# Patient Record
Sex: FEMALE | Race: WHITE
[De-identification: names, ages, dates, MRNs, and addresses within clinical notes are randomized per-mention and may not be internally consistent; named-entity substitution may affect disease eponyms.]

---

## 2017-02-08 ENCOUNTER — HOSPITAL ENCOUNTER (OUTPATIENT)
Dept: HOSPITAL 45 - C.RDSM | Age: 67
Discharge: HOME | End: 2017-02-08
Attending: ORTHOPAEDIC SURGERY
Payer: COMMERCIAL

## 2017-02-08 DIAGNOSIS — M65.30: Primary | ICD-10-CM

## 2017-02-08 NOTE — DIAGNOSTIC IMAGING REPORT
LEFT HAND MIN 3 VIEWS



CLINICAL HISTORY: LEFT HAND PAIN/TRIGGER FINGER pain



COMPARISON: None.



DISCUSSION: Moderate generalized osteopenia of the major osseous structures.

Tavarez a potential partial periarticular distribution. Possible early

rheumatoid variant is considered.



No well-defined acute bony abnormality. Alignment is anatomic. There is no

evidence for soft tissue swelling.



IMPRESSION: Probable developing rheumatoid change. No acute bony abnormality.







Electronically signed by:  Ang Ibarra M.D.

2/8/2017 12:44 PM



Dictated Date/Time:  2/8/2017 12:43 PM

## 2017-02-27 ENCOUNTER — HOSPITAL ENCOUNTER (OUTPATIENT)
Dept: HOSPITAL 45 - X.SURG | Age: 67
Discharge: HOME | End: 2017-02-27
Attending: ORTHOPAEDIC SURGERY
Payer: COMMERCIAL

## 2017-02-27 VITALS — OXYGEN SATURATION: 95 % | HEART RATE: 57 BPM | SYSTOLIC BLOOD PRESSURE: 139 MMHG | DIASTOLIC BLOOD PRESSURE: 80 MMHG

## 2017-02-27 VITALS — TEMPERATURE: 97.34 F

## 2017-02-27 VITALS
HEIGHT: 64.02 IN | BODY MASS INDEX: 36.44 KG/M2 | BODY MASS INDEX: 36.44 KG/M2 | WEIGHT: 213.45 LBS | WEIGHT: 213.45 LBS | HEIGHT: 64.02 IN

## 2017-02-27 DIAGNOSIS — M65.332: Primary | ICD-10-CM

## 2017-02-27 DIAGNOSIS — Z79.4: ICD-10-CM

## 2017-02-27 DIAGNOSIS — E11.9: ICD-10-CM

## 2017-02-27 DIAGNOSIS — Z79.899: ICD-10-CM

## 2017-02-27 DIAGNOSIS — I10: ICD-10-CM

## 2017-02-27 DIAGNOSIS — E78.5: ICD-10-CM

## 2017-02-27 DIAGNOSIS — E66.9: ICD-10-CM

## 2017-02-27 NOTE — [facility] POST OPERATIVE BRIEF NOTE
Immediate Operative Summary


Operative Date


Feb 27, 2017.





Pre-Operative Diagnosis





Left Middle Finger Trigger Digit





Post-Operative Diagnosis





Same





Procedure(s) Performed





Left Middle Finger Open Trigger Finger Release





Surgeon


Dr. Coughlin





Assistant Surgeon(s)


Dr. Whitley





Estimated Blood Loss


3 ML





Findings


Thickened synovium L MF Flexor tendon sheath.





Specimens





A. Left Middle Finger Synovium Tendon Sheath





Drains


n/a





Anesthesia


Local + sedation





Complication(s)


None





Disposition


Recovery Room / PACU (Stable)

## 2017-02-27 NOTE — DISCHARGE INSTRUCTIONS-S[facility]
Discharge Instructions


Visit


Reason for Visit:  Left Middle Trigger Finger





Discharge


Discharge Diagnosis / Problem:  S/P L MF Trigger finger release.





Discharge Goals


Goal(s):  Decrease discomfort, Improve function, Increase independence





Activity Recommendations


Activity Limitations:  per Instructions/Follow-up section


May Resume Sexual Activity:  when tolerated


Shower/Bathe:  may shower/bathe in 3 days





Anesthesia


.





Post Anesthesia Instructions:





If you have had General Anesthesia or IV Sedation:





*  Do not drive today.


*  Resume driving when surgeon permits.


*  Do not make important decisions or sign legal documents today.


*  Call surgeon for:





   1.  Temperature elevations greater than 101 degrees F.


   2.  Uncontrollable pain.


   3.  Excessive bleeding.


   4.  Persistent nausea and vomiting.


   5.  Medication intolerance (nausea, vomiting or rash).





*  For nausea and vomiting use only clear liquids such as: tea, soda, bouillon 

until nausea subsides, then gradually increase diet as tolerated.





*  If you have any concerns or questions, call your surgeon's office.  If 

physician is unavailable and it is an emergency, call 911 or go to the nearest 

emergency room.





.





Instructions / Follow-Up


Instructions / Follow-Up


Dr. Coughlin  in 10-15 days


PT 2-3 days





Diet Recommendations


Home Diet:  resume previous diet





Procedures


Procedures Performed:  


Left Middle Finger Open Trigger Finger Release





Pending Studies


Studies pending at discharge:  no





Medical Emergencies








.


Who to Call and When:





Medical Emergencies:  If at any time you feel your situation is an emergency, 

please call 911 immediately.





.





Non-Emergent Contact


Non-Emergency issues call your:  Surgeon


Call Non-Emergent contact if:  temperature is above 101.5, your pain is not 

controlled, wound has increased drainage, wound has increased redness





.


.





"Provider Documentation" section prepared by Alex Coughlin.

## 2017-02-27 NOTE — [facility] POST OPERATIVE BRIEF NOTE
Immediate Operative Summary


Operative Date


Feb 27, 2017.





Pre-Operative Diagnosis





Left Middle Finger Trigger Digit





Post-Operative Diagnosis





Same





Procedure(s) Performed





Left Middle Finger Open Trigger Finger Release





Surgeon


Dr. Coughlin





Assistant Surgeon(s)


Dr. Whitley





Estimated Blood Loss


3 ML





Fluids (cc crystalloids)


500





Specimens





A. Left Middle Finger Synovium Tendon Sheath





Complication(s)


None





Disposition


PCU

## 2017-02-27 NOTE — [facility] OPERATIVE REPORT
Operative Report


Operative Date


Feb 27, 2017.





Pre-Operative Diagnosis





Left Middle Finger Trigger Digit





Post-Operative Diagnosis





Same





Procedure(s) Performed





Left Middle Finger Open Trigger Finger Release





Surgeon


Dr. Coughlin





Assistant Surgeon(s)


Dr. Whitley





Estimated Blood Loss


3 ML





Findings


Thickened synovium L MF Flexor tendon sheath.





Fluids (cc crystalloids)


500





Specimens





A. Left Middle Finger Synovium Tendon Sheath





Drains


n/a





Anesthesia


Local + sedation





Complication(s)


None





Disposition


Recovery Room / PACU (Stable)





Implants


n/a





Indications


The patient is a 66 year old female with a painful left middle finger trigger 

her that has not responded to conservative treatment.  The patient understands 

the risks of surgery, which include but are not limited to: bleeding, infection

, re-operation, damage to nerves and arteries, and continued pain.  The patient 

understands all of these instructions and explanations, all of their questions 

have been satisfactorily addressed.  The patient has elected to proceed with 

surgery and the informed consent was signed.





Description of Procedure


The patient was taken to the Operating Room and placed in the supine position 

on the operating table.  After a multidisciplinary time-out was performed 

identifying my initials on the left middle finger as the correct and operative 

limb, the patient agreed.  Prior to the incision being made, 600 milligrams of 

intravenous clindamycin was given.  The left arm was prepped and draped in the 

usual Orthopaedic sterile fashion.  A digital nerve block was performed with 6 

cc of a 50:50 mix of 1% Lidocaine and 0.5% Bupivacaine plain in the standard 

fashion.  The above mixture was injected along the planned incision for another 

2 cc.   After the local anesthetic had taken affect, a small 1 cm incision was 

made in the rodriguez crease in line with the middle finger.  This was carried 

down to the A1 pulley.  The A1 pulley was incised and a small portion was 

excised as well as some synovium for specimen.  The right angle clamp was used 

to deliver the flexor tendons into the wound.  The tendon then glided without 

catching or locking.  The patient was asked to move flex her left middle finger 

and there was no catching or locking.     





The wound and tendon sheath were copiously irrigated.  The skin was closed with 

4-0 Nylon.  The incision was covered with Xeroform, 4x4's, sterile cast padding 

and an ACE. The sponge and needle counts were correct.


I attest to the content of the Intraoperative Record and any orders documented 

therein.  Any exceptions are noted below.

## 2017-02-27 NOTE — ANESTHESIA PROGRESS NT - [facility]
Anesthesia Post Op Note


Date & Time


Feb 27, 2017 at 08:15





Vital Signs


Pain Intensity:  0





 Vital Signs Past 12 Hours








  Date Time  Temp Pulse Resp B/P Pulse Ox O2 Delivery O2 Flow Rate FiO2


 


2/27/17 07:46 36.3 70 16 107/69 95 Room Air  


 


2/27/17 06:29 36.5 61 18 157/82 96 Room Air  











Notes


Mental Status:  alert / awake / arousable, participated in evaluation


Pt Amnestic to Procedure:  Yes


Nausea / Vomiting:  adequately controlled


Pain:  adequately controlled


Airway Patency, RR, SpO2:  stable & adequate


BP & HR:  stable & adequate


Hydration State:  stable & adequate


Anesthetic Complications:  no major complications apparent

## 2017-03-20 ENCOUNTER — HOSPITAL ENCOUNTER (OUTPATIENT)
Dept: HOSPITAL 45 - C.MAMM | Age: 67
Discharge: HOME | End: 2017-03-20
Attending: FAMILY MEDICINE
Payer: COMMERCIAL

## 2017-03-20 DIAGNOSIS — Z12.31: Primary | ICD-10-CM

## 2017-03-20 NOTE — MAMMOGRAPHY REPORT
BILATERAL DIGITAL SCREENING MAMMOGRAM WITH CAD: 3/20/2017

CLINICAL HISTORY: Routine screening.  Patient has no complaints.  





TECHNIQUE: Bilateral CC and MLO views were obtained.  Current study was also evaluated with a Comput
er Aided Detection (CAD) system.  



COMPARISON: Comparison is made to exams dated:  2/24/2015 mammogram, 2/18/2014 mammogram, 2/14/2013 
mammogram, 2/8/2012 mammogram, 2/7/2011 mammogram, and 2/3/2010 mammogram - [facility]
nter.   



BREAST COMPOSITION:  The tissue of both breasts is almost entirely fatty.  



FINDINGS: There are a few stable round and punctate microcalcifications in the breasts.  No new susp
icious mass, architectural distortion or cluster of microcalcifications is seen.  



IMPRESSION:  ACR BI-RADS CATEGORY 1: NEGATIVE

There is no mammographic evidence of malignancy. A 1 year screening mammogram is recommended.  The p
atient will receive written notification of the results.  





Approximately 10% of breast cancers are not detected with mammography. A negative mammographic repor
t should not delay biopsy if a clinically suggestive mass is present.



Marie Rios M.D.          

ay/:3/20/2017 08:06:54  



Imaging Technologist: Kelsy Perkins, [facility]

letter sent: Normal 1/2  

BI-RADS Code: ACR BI-RADS Category 1: Negative

## 2017-04-11 ENCOUNTER — HOSPITAL ENCOUNTER (EMERGENCY)
Dept: HOSPITAL 45 - C.EDB | Age: 67
Discharge: HOME | End: 2017-04-11
Payer: COMMERCIAL

## 2017-04-11 VITALS
BODY MASS INDEX: 37.6 KG/M2 | WEIGHT: 220.24 LBS | BODY MASS INDEX: 37.6 KG/M2 | HEIGHT: 64.02 IN | WEIGHT: 220.24 LBS | HEIGHT: 64.02 IN

## 2017-04-11 VITALS — DIASTOLIC BLOOD PRESSURE: 76 MMHG | SYSTOLIC BLOOD PRESSURE: 135 MMHG | HEART RATE: 78 BPM | OXYGEN SATURATION: 98 %

## 2017-04-11 VITALS — TEMPERATURE: 97.7 F

## 2017-04-11 VITALS — OXYGEN SATURATION: 96 %

## 2017-04-11 DIAGNOSIS — K58.9: ICD-10-CM

## 2017-04-11 DIAGNOSIS — Z79.4: ICD-10-CM

## 2017-04-11 DIAGNOSIS — Z79.82: ICD-10-CM

## 2017-04-11 DIAGNOSIS — Z79.899: ICD-10-CM

## 2017-04-11 DIAGNOSIS — I49.3: Primary | ICD-10-CM

## 2017-04-11 DIAGNOSIS — R73.9: ICD-10-CM

## 2017-04-11 DIAGNOSIS — I15.9: ICD-10-CM

## 2017-04-11 LAB
ALP SERPL-CCNC: 211 U/L (ref 45–117)
ALT SERPL-CCNC: 20 U/L (ref 12–78)
ANION GAP SERPL CALC-SCNC: 9 MMOL/L (ref 3–11)
AST SERPL-CCNC: 12 U/L (ref 15–37)
BASOPHILS # BLD: 0.03 K/UL (ref 0–0.2)
BASOPHILS NFR BLD: 0.4 %
BUN SERPL-MCNC: 16 MG/DL (ref 7–18)
BUN/CREAT SERPL: 16.1 (ref 10–20)
CALCIUM SERPL-MCNC: 8.5 MG/DL (ref 8.5–10.1)
CHLORIDE SERPL-SCNC: 106 MMOL/L (ref 98–107)
CKMB/CK RATIO: 1.3 (ref 0–3)
CO2 SERPL-SCNC: 26 MMOL/L (ref 21–32)
COMPLETE: YES
CREAT CL PREDICTED SERPL C-G-VRATE: 67.1 ML/MIN
CREAT SERPL-MCNC: 0.96 MG/DL (ref 0.6–1.2)
EOSINOPHIL NFR BLD AUTO: 277 K/UL (ref 130–400)
GLUCOSE SERPL-MCNC: 191 MG/DL (ref 70–99)
HCT VFR BLD CALC: 42.1 % (ref 37–47)
IG%: 0.2 %
IMM GRANULOCYTES NFR BLD AUTO: 32 %
LYMPHOCYTES # BLD: 2.65 K/UL (ref 1.2–3.4)
MAGNESIUM SERPL-MCNC: 2.2 MG/DL (ref 1.8–2.4)
MCH RBC QN AUTO: 29.8 PG (ref 25–34)
MCHC RBC AUTO-ENTMCNC: 34 G/DL (ref 32–36)
MCV RBC AUTO: 87.7 FL (ref 80–100)
MONOCYTES NFR BLD: 8.7 %
NEUTROPHILS # BLD AUTO: 2.2 %
NEUTROPHILS NFR BLD AUTO: 56.5 %
PMV BLD AUTO: 10.2 FL (ref 7.4–10.4)
POTASSIUM SERPL-SCNC: 3.7 MMOL/L (ref 3.5–5.1)
RBC # BLD AUTO: 4.8 M/UL (ref 4.2–5.4)
SODIUM SERPL-SCNC: 141 MMOL/L (ref 136–145)
WBC # BLD AUTO: 8.28 K/UL (ref 4.8–10.8)

## 2017-04-11 NOTE — EMERGENCY ROOM VISIT NOTE
History


Report prepared by Donna:  Jacque Meléndez


Under the Supervision of:  Dr. Carlos Paredes M.D.


First contact with patient:  22:43


Chief Complaint:  CARDIAC ASSESSMENT


Stated Complaint:  IRREGULAR HEARTBEAT





History of Present Illness


The patient is a 66 year old female who presents to the Emergency Room with 

complaints of an irregular heartbeat starting several hours PTA. The patient 

states that earlier today she felt increase in fatigue and laid down and took a 

nap. She states that when she woke up she felt a "flutter" in her chest. She 

states it also felt like she would lose her breath for a minute when the 

flutter occurred. The patient states she then checked her pulse and states that 

it would be beating strong and then feel like it would stop beating. She states 

this correlated with her SOB feeling . The patient states she has also had 

diarrhea and abdominal pain the last few days and thought it was due to a flair 

up of irritable bowel syndrome. The patient states she has a history of asthma 

and that it has been doing well and she only uses her inhaler once in a while. 

The patient denies any chest pain.





   Source of History:  patient


   Onset:  several hours PTA


   Position:  chest


   Quality:  other (irregular)


   Associated Symptoms:  + SOB, + abdominal pain, + diarrhea, No chest pain





Review of Systems


See HPI for pertinent positives & negatives. A total of 10 systems reviewed and 

were otherwise negative.





Past Medical & Surgical


Medical Problems:


(1) Irritable bowel syndrome (IBS)








Family History





Patient reports no known family medical history.





Social History


Smoking Status:  Never Smoker


Marital Status:  


Housing Status:  lives with significant other


Occupation Status:  retired





Current/Historical Medications


Scheduled


Aspirin (Aspirin 81), 81 MG PO DAILY


Fluticasone Propionate (Flovent Hfa), 2 PUFFS INH BID


Fluticasone Propionate (Nasal) (Flonase Allergy Relief), 2 SPRAYS MOIRA DAILY


Hydralazine Hcl (Apresoline), 25 MG PO TID


Insulin Glargine (Lantus), 20-22 UNITS SC HS


Insulin Lispro (Human) (Humalog Kwikpen), 10-14 UNITS SQ AC


Multivitamin (Multivitamin), 1 TAB PO DAILY


Pantoprazole (Protonix), 20 MG PO DAILY


Polyethylene Glycol 3350 (Miralax), 0.5 DOSE PO QAM


Quinapril Hcl (Accupril), 40 MG PO DAILY





Scheduled PRN


Albuterol Hfa (Ventolin Hfa), 2 PUFFS INH QID PRN for cough/wheezing


Albuterol Sulf (Proventil 0.083% 2.5MG/3ML), 2.5 MG INH Q4 PRN for Wheezing


Carbamide Peroxide (Otic) (Ear Drops), 1 DROP INT OCU QID PRN for PRESSURE


Ketotifen Fumarate (Ophth) (Zaditor 0.025% Oph), 1 DROP OPB Q8H PRN for ALLERGY 

SX


Loratadine (Claritin), 10 MG PO DAILY PRN for allergy sx


Lorazepam (Ativan), 0.5 MG PO DAILY PRN for Anxiety





Allergies


Coded Allergies:  


     Cefdinir (Verified  Allergy, Intermediate, UTICARIA, 4/11/17)


     Glyburide (Verified  Allergy, Intermediate, ITCHING, 4/11/17)


     Moxifloxacin (Verified  Allergy, Intermediate, UTICARIA, 4/11/17)


     Penicillins (Verified  Allergy, Intermediate, HIVES, 4/11/17)


     Sulfa Antibiotics (Verified  Allergy, Intermediate, HIVES, 4/11/17)


     Sulfamethoxazole w/Trimethoprim (Verified  Allergy, Intermediate, HIVES, 4/ 11/17)


     Trimethoprim (Verified  Allergy, Intermediate, HIVES, 4/11/17)


     Quinolones (Verified  Allergy, Unknown, HEADACHES, 4/11/17)


     Sulfamethoxazole (Verified  Allergy, Unknown, 4/11/17)


     Ciprofloxacin (Verified  Adverse Reaction, Mild, HEADACHES, 4/11/17)





Physical Exam


Vital Signs











  Date Time  Temp Pulse Resp B/P Pulse Ox O2 Delivery O2 Flow Rate FiO2


 


4/11/17 23:58  78 20 135/76 98   


 


4/11/17 22:58     96 Room Air  


 


4/11/17 22:50  76      


 


4/11/17 22:48     96 Room Air  


 


4/11/17 22:47  82 20 192/74    


 


4/11/17 22:45     96 Room Air  


 


4/11/17 22:37 36.5 80 16 192/82 96 Room Air  











Physical Exam


GENERAL: Patient is a healthy-appearing well-nourished


HEAD: Normocephalic atraumatic


EYES: Ocular movements intact pupils equal and react to light


OROPHARYNX mucous membranes are moist no exudates present no erythema or edema 

present


NECK: Supple no nuchal rigidity


CHEST: Good equal expansion


LUNGS: Clear and equal to auscultation


CARDIAC: Normal S1 and S2


ABDOMEN: Soft nontender no guarding


BACK: No CVA tenderness


EXTREMITIES: No pain upon palpation normal muscle strength in all groups no 

clubbing cyanosis or edema


NEURO: Patient is following commands is answering questions appropriately. 

Alert and oriented x3 Cranial Nerves 2-12 grossly intact





Medical Decision & Procedures


ER Provider


Diagnostic Interpretation:


X-ray results as stated below per interpretation by me:





Chest X-Ray 1 View: 


no evidence of pneumonia, congestion, or pneumothorax.


No acute process.





Laboratory Results


4/11/17 22:55








Red Blood Count 4.80, Mean Corpuscular Volume 87.7, Mean Corpuscular Hemoglobin 

29.8, Mean Corpuscular Hemoglobin Concent 34.0, Mean Platelet Volume 10.2, 

Neutrophils (%) (Auto) 56.5, Lymphocytes (%) (Auto) 32.0, Monocytes (%) (Auto) 

8.7, Eosinophils (%) (Auto) 2.2, Basophils (%) (Auto) 0.4, Neutrophils # (Auto) 

4.68, Lymphocytes # (Auto) 2.65, Monocytes # (Auto) 0.72, Eosinophils # (Auto) 

0.18, Basophils # (Auto) 0.03





4/11/17 22:55

















Test


  4/11/17


22:55


 


White Blood Count


  8.28 K/uL


(4.8-10.8)


 


Red Blood Count


  4.80 M/uL


(4.2-5.4)


 


Hemoglobin


  14.3 g/dL


(12.0-16.0)


 


Hematocrit 42.1 % (37-47) 


 


Mean Corpuscular Volume


  87.7 fL


()


 


Mean Corpuscular Hemoglobin


  29.8 pg


(25-34)


 


Mean Corpuscular Hemoglobin


Concent 34.0 g/dl


(32-36)


 


Platelet Count


  277 K/uL


(130-400)


 


Mean Platelet Volume


  10.2 fL


(7.4-10.4)


 


Neutrophils (%) (Auto) 56.5 % 


 


Lymphocytes (%) (Auto) 32.0 % 


 


Monocytes (%) (Auto) 8.7 % 


 


Eosinophils (%) (Auto) 2.2 % 


 


Basophils (%) (Auto) 0.4 % 


 


Neutrophils # (Auto)


  4.68 K/uL


(1.4-6.5)


 


Lymphocytes # (Auto)


  2.65 K/uL


(1.2-3.4)


 


Monocytes # (Auto)


  0.72 K/uL


(0.11-0.59)


 


Eosinophils # (Auto)


  0.18 K/uL


(0-0.5)


 


Basophils # (Auto)


  0.03 K/uL


(0-0.2)


 


RDW Standard Deviation


  45.2 fL


(36.4-46.3)


 


RDW Coefficient of Variation


  13.9 %


(11.5-14.5)


 


Immature Granulocyte % (Auto) 0.2 % 


 


Immature Granulocyte # (Auto)


  0.02 K/uL


(0.00-0.02)


 


Anion Gap


  9.0 mmol/L


(3-11)


 


Est Creatinine Clear Calc


Drug Dose 67.1 ml/min 


 


 


Estimated GFR (


American) 71.9 


 


 


Estimated GFR (Non-


American 62.1 


 


 


BUN/Creatinine Ratio 16.1 (10-20) 


 


Calcium Level


  8.5 mg/dl


(8.5-10.1)


 


Magnesium Level


  2.2 mg/dl


(1.8-2.4)


 


Total Bilirubin


  0.3 mg/dl


(0.2-1)


 


Direct Bilirubin


  < 0.1 mg/dl


(0-0.2)


 


Aspartate Amino Transf


(AST/SGOT) 12 U/L (15-37) 


 


 


Alanine Aminotransferase


(ALT/SGPT) 20 U/L (12-78) 


 


 


Alkaline Phosphatase


  211 U/L


()


 


Total Creatine Kinase


  112 U/L


()


 


Creatine Kinase MB


  1.5 ng/ml


(0.5-3.6)


 


Creatine Kinase MB Ratio 1.3 (0-3.0) 


 


Troponin I


  < 0.015 ng/ml


(0-0.045)


 


Total Protein


  7.1 gm/dl


(6.4-8.2)


 


Albumin


  3.7 gm/dl


(3.4-5.0)


 


Lipase


  117 U/L


()





Labs reviewed by ED physician.





Medications Administered











 Medications


  (Trade)  Dose


 Ordered  Sig/Leslie


 Route  Start Time


 Stop Time Status Last Admin


Dose Admin


 


 Sodium Chloride


  (Nss 1000ml)  1,000 ml @ 


 999 mls/hr  Q1H1M STAT


 IV  4/11/17 22:52


 4/11/17 23:52 DC 4/11/17 23:03


999 MLS/HR


 


 Hydralazine HCl


  (Apresoline Tab)  25 mg  NOW  STAT


 PO  4/11/17 22:54


 4/11/17 22:55 DC 4/11/17 23:07


25 MG


 


 Potassium Chloride


  (Klor-Con M10)  30 meq  NOW  STAT


 PO  4/11/17 23:29


 4/11/17 23:30 DC 4/11/17 23:53


30 MEQ











ECG


Indication:  chest pain


Rate (beats per minute):  81


Rhythm:  sinus rhythm


Findings:  PVC, no acute ischemic change, no ectopy





ED Course


2243: Past medical records reviewed. The patient was evaluated in room A2. A 

complete history and physical examination was performed. 





2252: Ordered Sodium Chloride 1,000 ml @ 999 mls/hr IV, 





2254: Ordered Hydralazine HCl 25 mg PO. 





2329: Ordered Potassium Chloride 30 meq PO. 





2345: Upon reexamination the patient is resting comfortably. I discussed 

results and treatment plan with the patient. She verbalizes agreement and 

understanding. The patient is ready for discharge.





Medical Decision


Differential diagnosis:


Etiologies such as cardiac ischemia, aortic dissection, pulmonary embolism, 

pneumonia, pneumothorax, musculoskeletal, infections, pericarditis, myocarditis

, esophageal rupture, gastrointestinal, as well as others were entertained. 





This is a 66-year-old female who presents emergency department complaining of 

palpitations.  Upon arrival to the emergency department the patient is actively 

having PVCs which I believe are the cause of her palpitations.  She is not 

nature fibrillation.  She denies any chest pain or shortness of breath and.  I 

will note that the patient's sugar is elevated at 200.  For this reason she was 

given IV fluids.  The patient's blood pressure is also elevated however I 

believe that this is more from anxiety than anything else.  I discussed the 

need for follow-up with the patient's primary care physician for her 

hypertension.  She is also going to follow-up with cardiology for her PVCs.  

She was given potassium in the emergency department and I cautioned her on 

better control of her sugar.  Patient was in agreement with the treatment plan.





Impression





 Primary Impression:  


 PVC (premature ventricular contraction)


 Additional Impressions:  


 Hyperglycemia


 Hypertension





Scribe Attestation


The scribe's documentation has been prepared under my direction and personally 

reviewed by me in its entirety. I confirm that the note above accurately 

reflects all work, treatment, procedures, and medical decision making performed 

by me.





Departure Information


Dispostion


Home / Self-Care





Referrals


Basilio Pena M.D. (PCP)





Forms


IMPORTANT VISIT INFORMATION





Patient Instructions


My [facility]





Additional Instructions





Need follow up with PCP for hypertension


Please follow up with DR Vazquez's office for PVC's














You have been examined and treated today on an emergency basis only. This is 

not a substitute for, or an effort to provide, complete comprehensive medical 

care. It is impossible to recognize and treat all injuries or illnesses in a 

single emergency department visit. It is therefore important that you follow up 

closely with Dr Pena.  Call as soon as possible for an appointment.  





Thank you for your time and consideration.  I look forward to speaking with you 

again soon.  Please don't hesitate to call us if you have any questions.





Problem Qualifiers








 Additional Impressions:  


 Hypertension


 Hypertension type:  unspecified secondary hypertension  Qualified Codes:  

I15.9 - Secondary hypertension, unspecified
100

## 2017-04-12 NOTE — DIAGNOSTIC IMAGING REPORT
SINGLE VIEW CHEST



CLINICAL HISTORY:  Atypical chest pain.



FINDINGS: An AP, portable, upright chest radiograph is compared to study dated

9/1/2011. The examination is degraded by portable technique, large body habitus,

and patient rotation.  The heart is top normal for projection. The pulmonary

vasculature is noncongested. Chronic interstitial thickening is unchanged. The

lungs and pleural spaces are clear. No pneumothorax is seen. The skeletal

structures are osteopenic. The bony thorax is grossly intact.



IMPRESSION: No acute cardiopulmonary abnormality.







Electronically signed by:  Charlie Ryan M.D.

4/12/2017 7:46 AM



Dictated Date/Time:  4/12/2017 7:45 AM

## 2017-08-08 ENCOUNTER — HOSPITAL ENCOUNTER (OUTPATIENT)
Dept: HOSPITAL 45 - C.MAMM | Age: 67
Discharge: HOME | End: 2017-08-08
Attending: FAMILY MEDICINE
Payer: COMMERCIAL

## 2017-08-08 DIAGNOSIS — M85.852: ICD-10-CM

## 2017-08-08 DIAGNOSIS — M85.851: ICD-10-CM

## 2017-08-08 DIAGNOSIS — Z13.820: Primary | ICD-10-CM

## 2018-03-22 ENCOUNTER — HOSPITAL ENCOUNTER (OUTPATIENT)
Dept: HOSPITAL 45 - C.MAMM | Age: 68
Discharge: HOME | End: 2018-03-22
Attending: FAMILY MEDICINE
Payer: COMMERCIAL

## 2018-03-22 DIAGNOSIS — Z12.31: Primary | ICD-10-CM

## 2018-03-22 NOTE — MAMMOGRAPHY REPORT
BILATERAL DIGITAL SCREENING MAMMOGRAM TOMOSYNTHESIS WITH CAD: 3/22/2018

CLINICAL HISTORY: Routine screening.  Patient has no complaints.  





TECHNIQUE:  Breast tomosynthesis in addition to standard 2D mammography was performed. Current study 
was also evaluated with a Computer Aided Detection (CAD) system.  



COMPARISON: Comparison is made to exams dated:  3/20/2017 mammogram, 3/16/2016 mammogram, 2/24/2015 m
ammogram, 2/18/2014 mammogram, 2/14/2013 mammogram, and 2/8/2012 mammogram - [facility]
nter.   



BREAST COMPOSITION:  The tissue of both breasts is almost entirely fatty.  



FINDINGS:  No suspicious masses, calcifications, or areas of architectural distortion are noted in ei
ther breast. There has been no significant interval change compared to prior exams.  



IMPRESSION:  ACR BI-RADS CATEGORY 1: NEGATIVE

There is no mammographic evidence of malignancy. A 1 year screening mammogram is recommended.  The pa
tient will receive written notification of the results.  





Approximately 10% of breast cancers are not detected with mammography. A negative mammographic report
 should not delay biopsy if a clinically suggestive mass is present.



Melba Chowdhury M.D.          

/:3/22/2018 07:59:20  



Imaging Technologist: Kelsy CALDERA(R)(M), [facility]

letter sent: Normal 1/2  

BI-RADS Code: ACR BI-RADS Category 1: Negative

## 2018-08-14 ENCOUNTER — HOSPITAL ENCOUNTER (OUTPATIENT)
Dept: HOSPITAL 45 - C.GI | Age: 68
Discharge: HOME | End: 2018-08-14
Attending: INTERNAL MEDICINE
Payer: COMMERCIAL

## 2018-08-14 VITALS
BODY MASS INDEX: 36.78 KG/M2 | WEIGHT: 215.46 LBS | BODY MASS INDEX: 36.78 KG/M2 | HEIGHT: 64.02 IN | WEIGHT: 215.46 LBS | HEIGHT: 64.02 IN

## 2018-08-14 VITALS — SYSTOLIC BLOOD PRESSURE: 149 MMHG | OXYGEN SATURATION: 96 % | HEART RATE: 69 BPM | DIASTOLIC BLOOD PRESSURE: 84 MMHG

## 2018-08-14 VITALS — TEMPERATURE: 97.16 F

## 2018-08-14 DIAGNOSIS — E66.9: ICD-10-CM

## 2018-08-14 DIAGNOSIS — K29.50: ICD-10-CM

## 2018-08-14 DIAGNOSIS — Z80.0: ICD-10-CM

## 2018-08-14 DIAGNOSIS — Z88.1: ICD-10-CM

## 2018-08-14 DIAGNOSIS — Z79.899: ICD-10-CM

## 2018-08-14 DIAGNOSIS — Z88.0: ICD-10-CM

## 2018-08-14 DIAGNOSIS — Z79.82: ICD-10-CM

## 2018-08-14 DIAGNOSIS — Z88.2: ICD-10-CM

## 2018-08-14 DIAGNOSIS — E11.9: ICD-10-CM

## 2018-08-14 DIAGNOSIS — Z79.4: ICD-10-CM

## 2018-08-14 DIAGNOSIS — J45.909: ICD-10-CM

## 2018-08-14 DIAGNOSIS — Z87.891: ICD-10-CM

## 2018-08-14 DIAGNOSIS — E78.00: ICD-10-CM

## 2018-08-14 DIAGNOSIS — K44.9: ICD-10-CM

## 2018-08-14 DIAGNOSIS — I10: ICD-10-CM

## 2018-08-14 DIAGNOSIS — K20.8: ICD-10-CM

## 2018-08-14 DIAGNOSIS — R13.10: Primary | ICD-10-CM

## 2018-08-14 DIAGNOSIS — K26.9: ICD-10-CM

## 2018-08-14 DIAGNOSIS — Z88.8: ICD-10-CM

## 2018-08-14 NOTE — ENDO HISTORY AND PHYSICAL
History & Physical


Date of Service:


Aug 14, 2018.


Chief Complaint:


DYSPHAGIA


Referring Physician:


DR. BRANCH


History of Present Illness


68 yo CF who presents for EGD secondary to dysphagia.





Past Medical History


Diabetes, Asthma, Gastrointestinal Disorder, Reflux, High Cholesterol, 

Hypertension





Past Surgical History


Hx Cardiac Surgery:  No


Hx Internal Defibrillator:  No


Hx Pacemaker:  No


Hx Abdominal Surgery:  Yes (CHELY BSO)


Hx of Implantable Prosthesis:  No


Hx Post-Op Nausea and Vomiting:  No


Hx Cancer Surgery:  No


Hx Thoracic Surgery:  No


Hx Orthopedic:  Yes (RT SHOULDER SCOPE, LT KNEE  SCOPE, L4 SPUR REMOVAL,TRIGGER 

FINGER L HAND)


Hx Urinary Tract Surgery:  No





Family History


Colon CA, Polyp





Social History


Smoking Status:  Former Smoker


Hx Substance Use:  No


Hx Alcohol Use:  No (RARELY)





Allergies


Coded Allergies:  


     Cefdinir (Verified  Allergy, Intermediate, UTICARIA, 8/6/18)


     Glyburide (Verified  Allergy, Intermediate, ITCHING, 8/6/18)


     Moxifloxacin (Verified  Allergy, Intermediate, UTICARIA, 8/6/18)


     Penicillins (Verified  Allergy, Intermediate, HIVES, 8/6/18)


     Sulfa Antibiotics (Verified  Allergy, Intermediate, HIVES, 8/6/18)


     Sulfamethoxazole w/Trimethoprim (Verified  Allergy, Intermediate, HIVES, 8/ 6/18)


     Trimethoprim (Verified  Allergy, Intermediate, HIVES, 8/6/18)


     Quinolones (Verified  Allergy, Unknown, HEADACHES, 8/6/18)


     Sulfamethoxazole (Verified  Allergy, Unknown, HIVES, 8/6/18)


     Ciprofloxacin (Verified  Adverse Reaction, Mild, HEADACHES, 8/14/18)





Current Medications





Reported Home Medications








 Medications  Dose


 Route/Sig


 Max Daily Dose Days Date Category Dose


Instructions


 


 Coq10 (Coenzyme


 Q10


  (Ubidecarenone))


 200 Mg Cap  200 Mg


 PO QAM


    8/6/18 Reported 


 


 Dicyclomine Hcl


 10 Mg Cap  1 Cap


 PO TID PRN


   30 8/6/18 Reported 


 


 Zaditor 0.025%


 Oph (Ketotifen


 Fumarate (Ophth))


 0.025 % João  1 Drop


 OPB Q8H PRN


    4/11/17 Reported 


 


 Flovent Hfa


  (Fluticasone


 Propionate) 120


 Puffs/85169 Mcg


 Aero  2 Puffs


 INH BID PRN


   30 4/11/17 Reported 


 


 Humalog Kwikpen


  (Insulin Lispro


  (Human)) 100


 Unit/Ml Inj  10-14 Units


 SQ AC


    4/11/17 Reported 


 


 Lantus (Insulin


 Glargine) 100


 Unit/Ml Inj  20-22 Units


 SC HS


    4/11/17 Reported 


 


 Flonase Allergy


 Relief


  (Fluticasone


 Propionate


  (Nasal)) 50


 Mcg/Act Spr  2 Sprays


 MOIRA BID


    4/11/17 Reported  ALLERGY SYMPTOMS


 


 Multivitamin


  (Multivitamins)


 Tab  1 Tab


 PO QAM


    4/11/17 Reported 


 


 Accupril


  (Quinapril Hcl)


 40 Mg Tab  40 Mg


 PO QAM


    4/11/17 Reported 


 


 Protonix


  (Pantoprazole


 Sodium) 20 Mg Tab  20 Mg


 PO HS


    4/11/17 Reported 


 


 Ventolin Hfa


  (Albuterol) 200


 Puffs/51609 Mcg


 Aers  2 Puffs


 INH QID PRN


    4/11/17 Reported 


 


 Proventil 0.083%


 2.5MG/3ML


  (Albuterol Sulf)


 2.5 Mg/3 Ml Nebu  2.5 Mg


 INH Q4 PRN


    4/11/17 Reported 


 


 Aspirin 81


  (Aspirin) 81 Mg


 Tab  81 Mg


 PO QAM


    4/11/17 Reported 


 


 Ear Drops


  (Carbamide


 Peroxide (Otic))


 6.5 % Sol  1 Drop


 INT OCU QID PRN


    2/9/17 Reported 


 


 Miralax


  (Polyethylene


 Glycol 3350) 1


 Pow Pow  0.5 Dose


 PO QAM


    2/23/16 Reported 


 


 Ativan


  (Lorazepam) 0.5


 Mg Tab  0.5 Mg


 PO DAILY PRN


    10/24/14 Reported 


 


 Apresoline


  (Hydralazine Hcl)


 25 Mg Tab  25 Mg


 PO TID


    10/24/14 Reported 











Vital Signs


Weight (Kilograms):  97.73


Height (Feet):  5


Height (Inches):  4











  Date Time  Temp Pulse Resp B/P (MAP) Pulse Ox O2 Delivery O2 Flow Rate FiO2


 


8/14/18 09:04 36.2 80 16 173/66 (101) 97 Room Air  97











Physical Exam


General Appearance:  WD/WN, no apparent distress


Respiratory/Chest:  


   Auscultation:  breath sounds normal


Cardiovascular:  


   Heart Auscultation:  RRR


Abdomen:  


   Bowel Sounds:  normal


   Inspection & Palpation:  soft, non-distended, no tenderness, guarding & 

rebound





Assessment and Plan


Assessment:


68 yo CF who presents for EGD secondary to dysphagia.








Plan:


Proceed with EGD.

## 2018-08-14 NOTE — DISCHARGE INSTRUCTIONS
Endoscopy Patient Instructions


Date / Procedure(s) Performed


Aug 14, 2018.


EGD





Allergy Information


Coded Allergies:  


     Cefdinir (Verified  Allergy, Intermediate, UTICARIA, 8/6/18)


     Glyburide (Verified  Allergy, Intermediate, ITCHING, 8/6/18)


     Moxifloxacin (Verified  Allergy, Intermediate, UTICARIA, 8/6/18)


     Penicillins (Verified  Allergy, Intermediate, HIVES, 8/6/18)


     Sulfa Antibiotics (Verified  Allergy, Intermediate, HIVES, 8/6/18)


     Sulfamethoxazole w/Trimethoprim (Verified  Allergy, Intermediate, HIVES, 8/ 6/18)


     Trimethoprim (Verified  Allergy, Intermediate, HIVES, 8/6/18)


     Quinolones (Verified  Allergy, Unknown, HEADACHES, 8/6/18)


     Sulfamethoxazole (Verified  Allergy, Unknown, HIVES, 8/6/18)


     Ciprofloxacin (Verified  Adverse Reaction, Mild, HEADACHES, 8/14/18)





Discharge Date / Findings


Aug 14, 2018.


Duodenal ulcers


Gastric antrum biopsies


Hiatal hernia


Probable candida esophagitis s/p brushings





Medication Instructions


Stopped Medication(s):  


ASPIRIN HELD FOR WEEK


OK to resume all medications today as prescribed





Reported Home Medications








 Medications  Dose


 Route/Sig


 Max Daily Dose Days Date Category Dose


Instructions


 


 Coq10 (Coenzyme


 Q10


  (Ubidecarenone))


 200 Mg Cap  200 Mg


 PO QAM


    8/6/18 Reported 


 


 Dicyclomine Hcl


 10 Mg Cap  1 Cap


 PO TID PRN


   30 8/6/18 Reported 


 


 Zaditor 0.025%


 Oph (Ketotifen


 Fumarate (Ophth))


 0.025 % João  1 Drop


 OPB Q8H PRN


    4/11/17 Reported 


 


 Flovent Hfa


  (Fluticasone


 Propionate) 120


 Puffs/84889 Mcg


 Aero  2 Puffs


 INH BID PRN


   30 4/11/17 Reported 


 


 Humalog Kwikpen


  (Insulin Lispro


  (Human)) 100


 Unit/Ml Inj  10-14 Units


 SQ AC


    4/11/17 Reported 


 


 Lantus (Insulin


 Glargine) 100


 Unit/Ml Inj  20-22 Units


 SC HS


    4/11/17 Reported 


 


 Flonase Allergy


 Relief


  (Fluticasone


 Propionate


  (Nasal)) 50


 Mcg/Act Spr  2 Sprays


 MOIRA BID


    4/11/17 Reported  ALLERGY SYMPTOMS


 


 Multivitamin


  (Multivitamins)


 Tab  1 Tab


 PO QAM


    4/11/17 Reported 


 


 Accupril


  (Quinapril Hcl)


 40 Mg Tab  40 Mg


 PO QAM


    4/11/17 Reported 


 


 Protonix


  (Pantoprazole


 Sodium) 20 Mg Tab  20 Mg


 PO HS


    4/11/17 Reported 


 


 Ventolin Hfa


  (Albuterol) 200


 Puffs/87748 Mcg


 Aers  2 Puffs


 INH QID PRN


    4/11/17 Reported 


 


 Proventil 0.083%


 2.5MG/3ML


  (Albuterol Sulf)


 2.5 Mg/3 Ml Nebu  2.5 Mg


 INH Q4 PRN


    4/11/17 Reported 


 


 Aspirin 81


  (Aspirin) 81 Mg


 Tab  81 Mg


 PO QAM


    4/11/17 Reported 


 


 Ear Drops


  (Carbamide


 Peroxide (Otic))


 6.5 % Sol  1 Drop


 INT OCU QID PRN


    2/9/17 Reported 


 


 Miralax


  (Polyethylene


 Glycol 3350) 1


 Pow Pow  0.5 Dose


 PO QAM


    2/23/16 Reported 


 


 Ativan


  (Lorazepam) 0.5


 Mg Tab  0.5 Mg


 PO DAILY PRN


    10/24/14 Reported 


 


 Apresoline


  (Hydralazine Hcl)


 25 Mg Tab  25 Mg


 PO TID


    10/24/14 Reported 











Provider Instructions





Activity Restrictions





-  No exercising or heavy lifting for 24 hours. 


-  Do not drink alcohol the day of the procedure.


-  Do not drive a car or operate machinery until the day after the procedure.


-  Do not make any important decisions or sign important papers in 24 hours 

after the procedure.





Following Day:





-  Return to full activity which may include returning to work/school.





Diet





Start your diet with liquids and light foods (jello, soup, juice, toast).  Then 

eat your usual diet if not nauseated.





Treatment For Common After Affects





For mild abdominal pain, bloating, or excessive gas:





-  Rest


-  Eat lightly


-  Lie on right side





Follow-Up Information


Follow-up with DR. BRANCH as scheduled





Anesthesia Information





What You Should Know





You have had a procedure that required some medicine to reduce anxiety and 

discomfort. This treatment is called moderate sedation.  


After receiving the treatment, you may be sleepy, but you will be able to 

breathe on your own.  The effects of the treatment may last for several hours.








Follow these instructions along with Activity/Diet recommendations noted above:





*  Do NOT do anything where dizziness or clumsiness would be dangerous.





*  Rest quietly at home today, then you can be up and about tomorrow.





*  Have a responsible person stay with you the rest of today.





*  You may have had an I.V. today.  If so, you may take the dressing off later 

today.





Recommendations


 


Call your doctor if:





*  Trouble breathing 





*  Continuous vomiting for more than 24 hours








*  Temperature above 101 degrees





*  Severe abdominal pain or bloating





*  Pain not relieved by pain medicine ordered





*  There is increased drainage or redness from any incision





*  A large amount of rectal bleeding greater than 2-3 tablespoons. 


   (If you had a polyp/s removed or have hemorrhoids, a small amount of blood -


    from the rectum is to be expected.)





*  You have any unanswered questions or concerns.








IN THE EVENT OF A SERIOUS EMERGENCY, GO TO THE NEAREST EMERGENCY ROOM








       Your discharge instructions were prepared by provider Alejo Bentley.





 Patient Instructions Signature Page














Katie Meera 











Patient (or Guardian) Signature/Date:____________________________________ I 

have read and understand the instructions given to me by my caregivers.








Caregiver/RN/Doctor Signature/Date:____________________________________











The above-named patient and/or guardian has received patient instructions on 

this date.





























+  Original Patient Signature Page (only) stays with chart.  Please make copy 

for patient.

## 2018-08-14 NOTE — ANESTHESIOLOGY PROGRESS NOTE
Anesthesia Post Op Note


Date & Time


Aug 14, 2018 at 10:36





Vital Signs


Pain Intensity:  0





Vital Signs Past 12 Hours








  Date Time  Temp Pulse Resp B/P (MAP) Pulse Ox O2 Delivery O2 Flow Rate FiO2


 


8/14/18 10:28  66 16 139/63 (88) 95 Room Air  


 


8/14/18 10:13  74 14 123/55 (77) 94 Room Air  


 


8/14/18 09:04 36.2 80 16 173/66 (101) 97 Room Air  97











Notes


Mental Status:  alert / awake / arousable, participated in evaluation


Pt Amnestic to Procedure:  Yes


Nausea / Vomiting:  adequately controlled


Pain:  adequately controlled


Airway Patency, RR, SpO2:  stable & adequate


BP & HR:  stable & adequate


Hydration State:  stable & adequate


Anesthetic Complications:  no major complications apparent

## 2018-08-14 NOTE — GI REPORT
Patient Name: Katie Estes

Procedure Date: 8/14/2018 9:11 AM

MRN: [id number]

Account Number: [id number]

YOB: 1950

Admit Type: Outpatient

Age: 67

Gender: Female

Attending MD: Alejo Bentley DO

Procedure:            Upper GI endoscopy

Providers:            Alejo Bentley DO

Referring MD:         Mona Wadsworth

Indications:          Dysphagia

Medicines:            Monitored Anesthesia Care

Complications:        No immediate complications.

Estimated Blood Loss: Estimated blood loss: none.

Procedure:            Pre-Anesthesia Assessment:

                      - Prior to the procedure, a History and Physical was 

                      performed, and patient medications and allergies were 

                      reviewed. The patient's tolerance of previous 

                      anesthesia was also reviewed. The risks and benefits of 

                      the procedure and the sedation options and risks were 

                      discussed with the patient. All questions were 

                      answered, and informed consent was obtained. Prior 

                      Anticoagulants: The patient has taken aspirin, last 

                      dose was 3 days prior to procedure. ASA Grade 

                      Assessment: IV - A patient with severe systemic disease 

                      that is a constant threat to life. After reviewing the 

                      risks and benefits, the patient was deemed in 

                      satisfactory condition to undergo the procedure.

                      After obtaining informed consent, the endoscope was 

                      passed under direct vision. Throughout the procedure, 

                      the patient's blood pressure, pulse, and oxygen 

                      saturations were monitored continuously. The scope was 

                      introduced through the mouth, and advanced to the 

                      second part of duodenum. The upper GI endoscopy was 

                      accomplished without difficulty. The patient tolerated 

                      the procedure well.

Findings:

     Probable candidiasis was found in the upper third of the esophagus. 

     Cells for cytology were obtained by brushing.

     A small hiatal hernia was present.

     Biopsies were taken with a cold forceps in the gastric antrum for 

     Helicobacter pylori testing.

     Few non-bleeding superficial duodenal ulcers with no stigmata of 

     bleeding were found in the duodenal bulb. The largest lesion was 5 mm in 

     largest dimension.

Impression:           - Monilial esophagitis. Cells for cytology obtained.

                      - Small hiatal hernia.

                      - Multiple non-bleeding duodenal ulcers with no 

                      stigmata of bleeding.

                      - Biopsies were taken with a cold forceps for 

                      Helicobacter pylori testing.

Recommendation:       - Resume previous diet.

                      - Continue present medications.

                      - Await pathology results.

                      - Return to primary care physician as previously 

                      scheduled.

Alejo Bentley DO

8/14/2018 10:22:17 AM

This report has been signed electronically.

Note Initiated On: 8/14/2018 9:11 AM

Number of Addenda: 0

     I attest to the content of the Intraoperative Record and orders 

     documented therein, exceptions below



{[id number]}